# Patient Record
Sex: MALE | URBAN - METROPOLITAN AREA
[De-identification: names, ages, dates, MRNs, and addresses within clinical notes are randomized per-mention and may not be internally consistent; named-entity substitution may affect disease eponyms.]

---

## 2022-04-07 ENCOUNTER — OFFICE VISIT (OUTPATIENT)
Dept: SURGERY | Facility: CLINIC | Age: 27
End: 2022-04-07
Payer: COMMERCIAL

## 2022-04-07 VITALS
HEART RATE: 93 BPM | SYSTOLIC BLOOD PRESSURE: 130 MMHG | WEIGHT: 200 LBS | TEMPERATURE: 98 F | DIASTOLIC BLOOD PRESSURE: 88 MMHG

## 2022-04-07 DIAGNOSIS — L72.3 SEBACEOUS CYST: Primary | ICD-10-CM

## 2022-04-14 ENCOUNTER — OFFICE VISIT (OUTPATIENT)
Dept: SURGERY | Facility: CLINIC | Age: 27
End: 2022-04-14
Payer: COMMERCIAL

## 2022-04-14 VITALS
TEMPERATURE: 97 F | HEART RATE: 86 BPM | WEIGHT: 200 LBS | SYSTOLIC BLOOD PRESSURE: 136 MMHG | DIASTOLIC BLOOD PRESSURE: 90 MMHG

## 2022-04-14 DIAGNOSIS — L72.3 INFECTED SEBACEOUS CYST: Primary | ICD-10-CM

## 2022-04-14 DIAGNOSIS — L08.9 INFECTED SEBACEOUS CYST: Primary | ICD-10-CM

## 2022-04-14 PROCEDURE — 3080F DIAST BP >= 90 MM HG: CPT | Performed by: SURGERY

## 2022-04-14 PROCEDURE — 3075F SYST BP GE 130 - 139MM HG: CPT | Performed by: SURGERY

## 2022-04-14 PROCEDURE — 99213 OFFICE O/P EST LOW 20 MIN: CPT | Performed by: SURGERY

## 2022-05-06 NOTE — PROCEDURES
Procedure note    Date of procedure-April 7, 2022    Preoperative diagnosis-infected sebaceous cyst of the central back    Indication for procedure-painful sebaceous cyst    Postoperative diagnosis-same    Procedure-   incision, drainage, debridement infected sebaceous cyst of the back    Anesthesia- local    SurgeonPatti Torre    Discussed with Patient-the potential risks and benefits of the procedure were reviewed in detail with the patient including but not limited to bleeding, infection, seroma hematoma formation, possibility of recurrence, possible need for further intervention pending clinical course. These another potential intraoperative and perioperative risks were reviewed. The patient and any family members that are present have no further questions at this time and do wish to proceed as discussed. Summary of procedure-patient was placed on the examination table in a prone position. Area was prepped with Betadine. 1% lidocaine was used to infiltrate the skin. A 11 blade scalpel was then made used to make a transverse incision. A fair amount of purulent fluid and sebum was retrieved. The wound was probed for residual fragments of sebum. The wound was then packed open. The patient did tolerate the procedure well. Discharge instructions were given to the patient as well as any family members present. They do not have any further questions at this time. Follow-up directions were also given. The patient was discharged from the office in good condition.     Opal Zamarripa M.D.

## 2023-06-16 ENCOUNTER — OFFICE VISIT (OUTPATIENT)
Facility: LOCATION | Age: 28
End: 2023-06-16
Payer: COMMERCIAL

## 2023-06-16 VITALS — HEART RATE: 95 BPM | TEMPERATURE: 97 F

## 2023-06-16 DIAGNOSIS — L08.9 INFECTED SEBACEOUS CYST: Primary | ICD-10-CM

## 2023-06-16 DIAGNOSIS — L72.3 INFECTED SEBACEOUS CYST: Primary | ICD-10-CM

## 2023-06-16 PROCEDURE — 99213 OFFICE O/P EST LOW 20 MIN: CPT | Performed by: SURGERY

## 2023-06-16 PROCEDURE — 10061 I&D ABSCESS COMP/MULTIPLE: CPT | Performed by: SURGERY

## 2023-06-16 RX ORDER — AMOXICILLIN AND CLAVULANATE POTASSIUM 875; 125 MG/1; MG/1
1 TABLET, FILM COATED ORAL 2 TIMES DAILY
Qty: 14 TABLET | Refills: 0 | Status: SHIPPED | OUTPATIENT
Start: 2023-06-16 | End: 2023-06-23

## 2023-06-26 ENCOUNTER — OFFICE VISIT (OUTPATIENT)
Facility: LOCATION | Age: 28
End: 2023-06-26
Payer: COMMERCIAL

## 2023-06-26 VITALS — HEART RATE: 92 BPM | TEMPERATURE: 98 F

## 2023-06-26 DIAGNOSIS — L72.3 INFECTED SEBACEOUS CYST: Primary | ICD-10-CM

## 2023-06-26 DIAGNOSIS — L08.9 INFECTED SEBACEOUS CYST: Primary | ICD-10-CM

## (undated) NOTE — LETTER
22    Patient: Jazz Wyatt  : 1995 Visit date: 2022    Dear  Jennifer Goznalez MD    Thank you for referring Jazz Wyatt to my practice. Please find my assessment and plan below. History of Present Illness    Today, I am seeing this patient for follow up-patient is status post incision and drainage of a large infected sebaceous cyst of the central back    On today's examination the wound is granulating. It is nearly flush with the skin. There is no drainage. There is no cellulitis. There is no erythema. There is induration as expected. No further indication for general surgical intervention at this time. The induration is expected to resolve spontaneously. Assessment   No diagnosis found. Plan     Continue local wound care. Wound is expected to heal by secondary intention without incidence. No indication for surgical excision at this time  Patient will follow up with me as needed.   He will return to for his routine care      Thank you for allowing me to participate in your patient's care         Sincerely,       Promise Poon MD   CC: No Recipients

## (undated) NOTE — LETTER
2022    Return to Work    Name: Ingrid June        : 1995    To Whom It May Concern,    Ingrid June had a procedure on 2022 and is:    Able to return to school / work with restrictions:  No lifting over: 20 lbs until 22. The patient may return to work on 22. If there are any further questions, regarding this patient's care, please contact the patient directly.     Sincerely,    Dr. Anthony Payton

## (undated) NOTE — LETTER
2023    Return to Work    Name: Nate Vasquez        : 1995    To Whom It May Concern,    Nate Vasquez had surgery on 2023 and is:    Able to return to school / work without restrictions on 2023. The patient may return to work on 2023. If there are any further questions, regarding this patient's care, please contact the patient directly.     Sincerely,    Dr. Georges Browne MD

## (undated) NOTE — LETTER
23    Patient: Tatum Covington  : 1995 Visit date: 2023    Dear  Holden Reinoso MD    Thank you for referring Tatum Covington to my practice. Please find my assessment and plan below. History of Present Illness   24-year-old male who is here for evaluation infected sebaceous cyst of the back. Garry Foreman reports that he has had this cyst for quite some time. Approximately 1 year ago it was incised and drained. The cyst did decrease to a very small size and asked her did not proceed with excision at that time. Over the past several days it has increased in size again and is becoming increasingly painful. He is having difficulty sleeping due to the size and tenderness of the cyst.    On physical examination the patient does have a large 5 to 6 cm area of induration with central fluctuance which is quite protuberant consistent with a infected sebaceous cyst.    Treatment options were reviewed in detail with Garry Foreman. At this time the recommendation is to proceed with incision and drainage of this infected sebaceous cyst in the office. The patient will also be started on a course of antibiotics. Once the acute inflammatory changes have resolved, would recommend proceed with excision of residual cyst    Incision and drainage was performed. Separate procedure note is dictated. Patient tolerated the procedure well. Wound was packed open. Postprocedural instructions were discussed. Garry Foreman will remove the packing tomorrow    Assessment  Infected sebaceous cyst  (primary encounter diagnosis)        Plan     Incision and drainage of infected sebaceous cyst of the left upper back. Wound was packed open. Patient will start a course of Augmentin 875 mg twice daily for 7 days. Patient will follow-up in office in 7 to 10 days for repeat examination  Packing is to be removed tomorrow.           Sincerely,       Marilia Davidson MD   CC:   No Recipients